# Patient Record
Sex: MALE | Race: AMERICAN INDIAN OR ALASKA NATIVE
[De-identification: names, ages, dates, MRNs, and addresses within clinical notes are randomized per-mention and may not be internally consistent; named-entity substitution may affect disease eponyms.]

---

## 2019-03-12 ENCOUNTER — HOSPITAL ENCOUNTER (EMERGENCY)
Dept: HOSPITAL 5 - ED | Age: 39
Discharge: HOME | End: 2019-03-12
Payer: SELF-PAY

## 2019-03-12 VITALS — DIASTOLIC BLOOD PRESSURE: 96 MMHG | SYSTOLIC BLOOD PRESSURE: 154 MMHG

## 2019-03-12 DIAGNOSIS — F17.200: ICD-10-CM

## 2019-03-12 DIAGNOSIS — K21.9: ICD-10-CM

## 2019-03-12 DIAGNOSIS — K40.90: Primary | ICD-10-CM

## 2019-03-12 LAB
ALBUMIN SERPL-MCNC: 4.9 G/DL (ref 3.9–5)
ALT SERPL-CCNC: 33 UNITS/L (ref 7–56)
BASOPHILS # (AUTO): 0 K/MM3 (ref 0–0.1)
BASOPHILS NFR BLD AUTO: 0.4 % (ref 0–1.8)
BUN SERPL-MCNC: 12 MG/DL (ref 9–20)
BUN/CREAT SERPL: 17 %
CALCIUM SERPL-MCNC: 9.8 MG/DL (ref 8.4–10.2)
EOSINOPHIL # BLD AUTO: 0 K/MM3 (ref 0–0.4)
EOSINOPHIL NFR BLD AUTO: 0 % (ref 0–4.3)
HCT VFR BLD CALC: 47.3 % (ref 35.5–45.6)
HEMOLYSIS INDEX: 28
HGB BLD-MCNC: 15.9 GM/DL (ref 11.8–15.2)
LYMPHOCYTES # BLD AUTO: 0.7 K/MM3 (ref 1.2–5.4)
LYMPHOCYTES NFR BLD AUTO: 7.1 % (ref 13.4–35)
MCHC RBC AUTO-ENTMCNC: 34 % (ref 32–34)
MCV RBC AUTO: 92 FL (ref 84–94)
MONOCYTES # (AUTO): 0.4 K/MM3 (ref 0–0.8)
MONOCYTES % (AUTO): 4.2 % (ref 0–7.3)
PLATELET # BLD: 242 K/MM3 (ref 140–440)
RBC # BLD AUTO: 5.14 M/MM3 (ref 3.65–5.03)

## 2019-03-12 PROCEDURE — 96361 HYDRATE IV INFUSION ADD-ON: CPT

## 2019-03-12 PROCEDURE — 36415 COLL VENOUS BLD VENIPUNCTURE: CPT

## 2019-03-12 PROCEDURE — 85025 COMPLETE CBC W/AUTO DIFF WBC: CPT

## 2019-03-12 PROCEDURE — 83690 ASSAY OF LIPASE: CPT

## 2019-03-12 PROCEDURE — 96374 THER/PROPH/DIAG INJ IV PUSH: CPT

## 2019-03-12 PROCEDURE — 80053 COMPREHEN METABOLIC PANEL: CPT

## 2019-03-12 PROCEDURE — 96375 TX/PRO/DX INJ NEW DRUG ADDON: CPT

## 2019-03-12 PROCEDURE — 99283 EMERGENCY DEPT VISIT LOW MDM: CPT

## 2019-03-12 NOTE — EMERGENCY DEPARTMENT REPORT
Blank Doc





- Documentation


Documentation: 





38 year old male c/o 3 day N/V secondary to  "bad reflux" needs medicaiton. ab

dominal pain minimal just week because cant eat.

## 2019-03-12 NOTE — EMERGENCY DEPARTMENT REPORT
ED N/V/D HPI





- General


Chief complaint: Nausea/Vomiting/Diarrhea


Stated complaint: N/V


Time Seen by Provider: 03/12/19 11:25


Source: patient


Mode of arrival: Wheelchair


Limitations: No Limitations





- History of Present Illness


Initial comments: 





Pt is a 37 yo male who presents to the ED with c/o N/V/D that began 2-3 days 

ago. He denies any fever, urinary sx, or abdominal pain. The patient states he 

has not been able to tolerate much PO intake. He denies any sick contacts, new 

foods, or new water sources. The patient states that he has "really bad acid 

reflux" but has not been taking any medication for it. 





- Related Data


                                  Previous Rx's











 Medication  Instructions  Recorded  Last Taken  Type


 


Famotidine [Pepcid] 20 mg PO DAILY #30 tablet 03/12/19 Unknown Rx


 


Hyoscyamine Subl [Levsin Sl 0.125 0.125 mg SL Q6HR PRN #10 tab 03/12/19 Unknown 

Rx





TAB]    


 


Ondansetron [Zofran Odt] 4 mg PO Q8HR PRN #14 tab.rapdis 03/12/19 Unknown Rx











                                    Allergies











Allergy/AdvReac Type Severity Reaction Status Date / Time


 


No Known Allergies Allergy   Unverified 03/12/19 11:20














ED Review of Systems


ROS: 


Stated complaint: N/V


Other details as noted in HPI





Comment: All other systems reviewed and negative





ED Past Medical Hx





- Past Medical History


Previous Medical History?: Yes


Hx GERD: Yes





- Surgical History


Past Surgical History?: No


Additional Surgical History: bilateral wrist





- Social History


Smoking Status: Current Every Day Smoker


Substance Use Type: Alcohol





- Medications


Home Medications: 


                                Home Medications











 Medication  Instructions  Recorded  Confirmed  Last Taken  Type


 


Famotidine [Pepcid] 20 mg PO DAILY #30 tablet 03/12/19  Unknown Rx


 


Hyoscyamine Subl [Levsin Sl 0.125 0.125 mg SL Q6HR PRN #10 tab 03/12/19  Unknown

 Rx





TAB]     


 


Ondansetron [Zofran Odt] 4 mg PO Q8HR PRN #14 tab.rapdis 03/12/19  Unknown Rx














ED Physical Exam





- General


Limitations: No Limitations


General appearance: alert, in no apparent distress





- Head


Head exam: Present: atraumatic, normocephalic





- Eye


Eye exam: Present: normal appearance





- ENT


ENT exam: Present: mucous membranes dry (mildly )





- Respiratory


Respiratory exam: Present: normal lung sounds bilaterally.  Absent: respiratory 

distress, wheezes, rales, rhonchi, stridor, chest wall tenderness, accessory 

muscle use, decreased breath sounds, prolonged expiratory





- Cardiovascular


Cardiovascular Exam: Present: regular rate, normal rhythm, normal heart sounds. 

Absent: systolic murmur, rubs, gallop





- GI/Abdominal


GI/Abdominal exam: Present: soft, normal bowel sounds, hernia (small left sided 

inguinal hernia easily reducible, not incarcerated, not strangulated ).  Absent:

distended, tenderness, guarding, rebound, rigid





- Neurological Exam


Neurological exam: Present: alert, oriented X3





- Psychiatric


Psychiatric exam: Present: normal affect, normal mood





ED Course


                                   Vital Signs











  03/12/19 03/12/19





  11:25 13:23


 


Temperature 98.4 F 


 


Pulse Rate 101 H 71


 


Respiratory 20 18





Rate  


 


Blood Pressure 154/111 


 


Blood Pressure  154/96





[Right]  


 


O2 Sat by Pulse 99 99





Oximetry  














ED Medical Decision Making





- Lab Data


Result diagrams: 


                                 03/12/19 11:32





                                 03/12/19 11:32








                         Laboratory Results - last 24 hr











  03/12/19 03/12/19





  11:32 11:32


 


WBC  9.5 


 


RBC  5.14 H 


 


Hgb  15.9 H 


 


Hct  47.3 H 


 


MCV  92 


 


MCH  31 


 


MCHC  34 


 


RDW  13.5 


 


Plt Count  242 


 


Lymph % (Auto)  7.1 L 


 


Mono % (Auto)  4.2 


 


Eos % (Auto)  0.0 


 


Baso % (Auto)  0.4 


 


Lymph #  0.7 L 


 


Mono #  0.4 


 


Eos #  0.0 


 


Baso #  0.0 


 


Seg Neutrophils %  88.3 H 


 


Seg Neutrophils #  8.4 H 


 


Sodium   143


 


Potassium   4.3


 


Chloride   99.2


 


Carbon Dioxide   23


 


Anion Gap   25


 


BUN   12


 


Creatinine   0.7 L


 


Estimated GFR   > 60


 


BUN/Creatinine Ratio   17


 


Glucose   132 H


 


Calcium   9.8


 


Total Bilirubin   0.70


 


AST   25


 


ALT   33


 


Alkaline Phosphatase   85


 


Total Protein   8.5 H


 


Albumin   4.9


 


Albumin/Globulin Ratio   1.4


 


Lipase   27














- Medical Decision Making





Pt presents with N/V/D for the last 2-3 days. Pt has GERD sx. He denies any 

abdominal pain, fever, or any other sx. After medications pt feels much better. 

PO challenged in ED and able to tolerate PO intake. Advised pt to follow up with

 primary care provider in the next 2-3 days. Gave pt medication for nausea and 

GERD. Return to ED if any new or worsening symptoms. 





- Differential Diagnosis


N/V/D, gastroenteritis, viral syndrome


Critical care attestation.: 


If time is entered above; I have spent that time in minutes in the direct care 

of this critically ill patient, excluding procedure time.








ED Disposition


Clinical Impression: 


 Nausea vomiting and diarrhea





Inguinal hernia


Qualifiers:


 Obstruction and gangrene presence: without obstruction or gangrene Laterality: 

unilateral Recurrence: not specified as recurrent Qualified Code(s): K40.90 - 

Unilateral inguinal hernia, without obstruction or gangrene, not specified as 

recurrent





Disposition: DC-01 TO HOME OR SELFCARE


Is pt being admited?: No


Does the pt Need Aspirin: No


Condition: Stable


Instructions:  Inguinal Hernia (ED), Acute Nausea and Vomiting (ED)


Additional Instructions: 


Follow up with primary care doctor in the next 2-3 days. Follow up with general 

surgery for inguinal hernia. Take all medication as prescribed. Return to the ED

 if any new or worsening symptoms. 


Prescriptions: 


Hyoscyamine Subl [Levsin Sl 0.125 TAB] 0.125 mg SL Q6HR PRN #10 tab


 PRN Reason: Spasms


Famotidine [Pepcid] 20 mg PO DAILY #30 tablet


Ondansetron [Zofran Odt] 4 mg PO Q8HR PRN #14 tab.rapdis


 PRN Reason: Nausea And Vomiting


Referrals: 


[facility], MD [Primary Care Provider] - 2-3 Days


ANNABELLE PORTILLO DO [Staff Physician] - 3-5 Days


Time of Disposition: 12:53


Print Language: ENGLISH